# Patient Record
Sex: MALE | Race: WHITE | HISPANIC OR LATINO | Employment: FULL TIME | URBAN - METROPOLITAN AREA
[De-identification: names, ages, dates, MRNs, and addresses within clinical notes are randomized per-mention and may not be internally consistent; named-entity substitution may affect disease eponyms.]

---

## 2017-03-15 ENCOUNTER — HOSPITAL ENCOUNTER (EMERGENCY)
Facility: HOSPITAL | Age: 42
Discharge: HOME | End: 2017-03-15
Attending: EMERGENCY MEDICINE

## 2017-03-15 ENCOUNTER — APPOINTMENT (EMERGENCY)
Dept: RADIOLOGY | Facility: HOSPITAL | Age: 42
End: 2017-03-15

## 2017-03-15 ENCOUNTER — APPOINTMENT (EMERGENCY)
Dept: ULTRASOUND IMAGING | Facility: HOSPITAL | Age: 42
End: 2017-03-15

## 2017-03-15 VITALS
TEMPERATURE: 98.5 F | WEIGHT: 216 LBS | HEART RATE: 69 BPM | OXYGEN SATURATION: 98 % | DIASTOLIC BLOOD PRESSURE: 86 MMHG | RESPIRATION RATE: 17 BRPM | SYSTOLIC BLOOD PRESSURE: 148 MMHG

## 2017-03-15 DIAGNOSIS — N13.30 HYDRONEPHROSIS OF RIGHT KIDNEY: Chronic | ICD-10-CM

## 2017-03-15 DIAGNOSIS — R10.9 RIGHT FLANK PAIN: ICD-10-CM

## 2017-03-15 DIAGNOSIS — R07.89 CHEST WALL PAIN: Primary | ICD-10-CM

## 2017-03-15 LAB
ANION GAP SERPL CALCULATED.3IONS-SCNC: 9 MMOL/L (ref 4–13)
APTT PPP: 30 SECONDS (ref 24–36)
BASOPHILS # BLD AUTO: 0.04 THOUSANDS/ΜL (ref 0–0.1)
BASOPHILS NFR BLD AUTO: 1 % (ref 0–1)
BILIRUB UR QL STRIP: NEGATIVE
BUN SERPL-MCNC: 11 MG/DL (ref 5–25)
CALCIUM SERPL-MCNC: 9.2 MG/DL (ref 8.3–10.1)
CHLORIDE SERPL-SCNC: 103 MMOL/L (ref 100–108)
CLARITY UR: CLEAR
CLARITY, POC: CLEAR
CO2 SERPL-SCNC: 28 MMOL/L (ref 21–32)
COLOR UR: YELLOW
COLOR, POC: YELLOW
CREAT SERPL-MCNC: 1.01 MG/DL (ref 0.6–1.3)
EOSINOPHIL # BLD AUTO: 0.43 THOUSAND/ΜL (ref 0–0.61)
EOSINOPHIL NFR BLD AUTO: 6 % (ref 0–6)
ERYTHROCYTE [DISTWIDTH] IN BLOOD BY AUTOMATED COUNT: 12.8 % (ref 11.6–15.1)
GFR SERPL CREATININE-BSD FRML MDRD: >60 ML/MIN/1.73SQ M
GLUCOSE SERPL-MCNC: 101 MG/DL (ref 65–140)
GLUCOSE UR STRIP-MCNC: NEGATIVE MG/DL
HCT VFR BLD AUTO: 43 % (ref 36.5–49.3)
HGB BLD-MCNC: 14.9 G/DL (ref 12–17)
HGB UR QL STRIP.AUTO: NEGATIVE
INR PPP: 0.93 (ref 0.86–1.16)
KETONES UR STRIP-MCNC: NEGATIVE MG/DL
LEUKOCYTE ESTERASE UR QL STRIP: NEGATIVE
LYMPHOCYTES # BLD AUTO: 2.61 THOUSANDS/ΜL (ref 0.6–4.47)
LYMPHOCYTES NFR BLD AUTO: 35 % (ref 14–44)
MCH RBC QN AUTO: 30.5 PG (ref 26.8–34.3)
MCHC RBC AUTO-ENTMCNC: 34.7 G/DL (ref 31.4–37.4)
MCV RBC AUTO: 88 FL (ref 82–98)
MONOCYTES # BLD AUTO: 0.66 THOUSAND/ΜL (ref 0.17–1.22)
MONOCYTES NFR BLD AUTO: 9 % (ref 4–12)
NEUTROPHILS # BLD AUTO: 3.83 THOUSANDS/ΜL (ref 1.85–7.62)
NEUTS SEG NFR BLD AUTO: 49 % (ref 43–75)
NITRITE UR QL STRIP: NEGATIVE
NRBC BLD AUTO-RTO: 0 /100 WBCS
NT-PROBNP SERPL-MCNC: 11 PG/ML
PH UR STRIP.AUTO: 6 [PH] (ref 4.5–8)
PLATELET # BLD AUTO: 344 THOUSANDS/UL (ref 149–390)
PMV BLD AUTO: 9.8 FL (ref 8.9–12.7)
POTASSIUM SERPL-SCNC: 3.4 MMOL/L (ref 3.5–5.3)
PROT UR STRIP-MCNC: NEGATIVE MG/DL
PROTHROMBIN TIME: 12.5 SECONDS (ref 12–14.3)
RBC # BLD AUTO: 4.89 MILLION/UL (ref 3.88–5.62)
SODIUM SERPL-SCNC: 140 MMOL/L (ref 136–145)
SP GR UR STRIP.AUTO: 1.01 (ref 1–1.03)
SPECIMEN SOURCE: NORMAL
TROPONIN I BLD-MCNC: 0 NG/ML (ref 0–0.08)
UROBILINOGEN UR QL STRIP.AUTO: 0.2 E.U./DL
WBC # BLD AUTO: 7.57 THOUSAND/UL (ref 4.31–10.16)

## 2017-03-15 PROCEDURE — 76770 US EXAM ABDO BACK WALL COMP: CPT

## 2017-03-15 PROCEDURE — 85730 THROMBOPLASTIN TIME PARTIAL: CPT

## 2017-03-15 PROCEDURE — 71020 HB CHEST X-RAY 2VW FRONTAL&LATL: CPT

## 2017-03-15 PROCEDURE — 36415 COLL VENOUS BLD VENIPUNCTURE: CPT

## 2017-03-15 PROCEDURE — 81003 URINALYSIS AUTO W/O SCOPE: CPT

## 2017-03-15 PROCEDURE — 80048 BASIC METABOLIC PNL TOTAL CA: CPT

## 2017-03-15 PROCEDURE — 81002 URINALYSIS NONAUTO W/O SCOPE: CPT | Performed by: EMERGENCY MEDICINE

## 2017-03-15 PROCEDURE — 96374 THER/PROPH/DIAG INJ IV PUSH: CPT

## 2017-03-15 PROCEDURE — 99285 EMERGENCY DEPT VISIT HI MDM: CPT

## 2017-03-15 PROCEDURE — 85025 COMPLETE CBC W/AUTO DIFF WBC: CPT

## 2017-03-15 PROCEDURE — 84484 ASSAY OF TROPONIN QUANT: CPT

## 2017-03-15 PROCEDURE — 93005 ELECTROCARDIOGRAM TRACING: CPT | Performed by: EMERGENCY MEDICINE

## 2017-03-15 PROCEDURE — 85610 PROTHROMBIN TIME: CPT

## 2017-03-15 PROCEDURE — 83880 ASSAY OF NATRIURETIC PEPTIDE: CPT

## 2017-03-15 RX ORDER — KETOROLAC TROMETHAMINE 30 MG/ML
30 INJECTION, SOLUTION INTRAMUSCULAR; INTRAVENOUS ONCE
Status: COMPLETED | OUTPATIENT
Start: 2017-03-15 | End: 2017-03-15

## 2017-03-15 RX ADMIN — KETOROLAC TROMETHAMINE 30 MG: 30 INJECTION, SOLUTION INTRAMUSCULAR at 18:38

## 2017-03-16 LAB
ATRIAL RATE: 67 BPM
P AXIS: 54 DEGREES
PR INTERVAL: 172 MS
QRS AXIS: 39 DEGREES
QRSD INTERVAL: 110 MS
QT INTERVAL: 370 MS
QTC INTERVAL: 390 MS
T WAVE AXIS: 47 DEGREES
VENTRICULAR RATE: 67 BPM

## 2019-04-08 ENCOUNTER — TELEPHONE (OUTPATIENT)
Dept: UROLOGY | Facility: MEDICAL CENTER | Age: 44
End: 2019-04-08

## 2019-04-15 ENCOUNTER — OFFICE VISIT (OUTPATIENT)
Dept: UROLOGY | Facility: MEDICAL CENTER | Age: 44
End: 2019-04-15
Payer: COMMERCIAL

## 2019-04-15 VITALS
HEART RATE: 102 BPM | SYSTOLIC BLOOD PRESSURE: 130 MMHG | WEIGHT: 200 LBS | DIASTOLIC BLOOD PRESSURE: 80 MMHG | HEIGHT: 71 IN | BODY MASS INDEX: 28 KG/M2

## 2019-04-15 DIAGNOSIS — R10.11 RIGHT UPPER QUADRANT PAIN: Primary | ICD-10-CM

## 2019-04-15 DIAGNOSIS — N13.30 HYDRONEPHROSIS OF RIGHT KIDNEY: ICD-10-CM

## 2019-04-15 LAB
SL AMB  POCT GLUCOSE, UA: NORMAL
SL AMB LEUKOCYTE ESTERASE,UA: NORMAL
SL AMB POCT BILIRUBIN,UA: NORMAL
SL AMB POCT BLOOD,UA: NORMAL
SL AMB POCT CLARITY,UA: CLEAR
SL AMB POCT COLOR,UA: YELLOW
SL AMB POCT KETONES,UA: NORMAL
SL AMB POCT NITRITE,UA: NORMAL
SL AMB POCT PH,UA: 6.5
SL AMB POCT SPECIFIC GRAVITY,UA: 1.02
SL AMB POCT URINE PROTEIN: NORMAL
SL AMB POCT UROBILINOGEN: 0.2

## 2019-04-15 PROCEDURE — 81003 URINALYSIS AUTO W/O SCOPE: CPT | Performed by: UROLOGY

## 2019-04-15 PROCEDURE — 99204 OFFICE O/P NEW MOD 45 MIN: CPT | Performed by: UROLOGY

## 2019-04-15 RX ORDER — NEOMYCIN SULFATE, POLYMYXIN B SULFATE AND HYDROCORTISONE 10; 3.5; 1 MG/ML; MG/ML; [USP'U]/ML
SUSPENSION/ DROPS AURICULAR (OTIC)
COMMUNITY
Start: 2019-02-25 | End: 2019-04-15 | Stop reason: ALTCHOICE

## 2019-04-15 RX ORDER — MOMETASONE FUROATE 50 UG/1
SPRAY, METERED NASAL
COMMUNITY
Start: 2019-02-25

## 2019-04-15 RX ORDER — MONTELUKAST SODIUM 10 MG/1
10 TABLET ORAL
COMMUNITY
Start: 2018-09-18 | End: 2021-06-17

## 2019-04-15 RX ORDER — MOMETASONE FUROATE 50 UG/1
2 SPRAY, METERED NASAL
COMMUNITY
Start: 2019-02-25 | End: 2019-04-15 | Stop reason: SDUPTHER

## 2019-04-15 RX ORDER — GUAIFENESIN, PSEUDOEPHEDRINE HYDROCHLORIDE 600; 60 MG/1; MG/1
1 TABLET, EXTENDED RELEASE ORAL EVERY 12 HOURS
COMMUNITY
Start: 2019-02-25 | End: 2019-04-15 | Stop reason: ALTCHOICE

## 2019-04-15 RX ORDER — ALBUTEROL SULFATE 90 UG/1
2 AEROSOL, METERED RESPIRATORY (INHALATION) EVERY 6 HOURS PRN
COMMUNITY

## 2019-04-15 RX ORDER — NAPROXEN SODIUM 220 MG
220 TABLET ORAL
COMMUNITY

## 2019-04-16 ENCOUNTER — TELEPHONE (OUTPATIENT)
Dept: UROLOGY | Facility: AMBULATORY SURGERY CENTER | Age: 44
End: 2019-04-16

## 2019-04-16 DIAGNOSIS — R10.11 RIGHT UPPER QUADRANT PAIN: Primary | ICD-10-CM

## 2019-04-22 ENCOUNTER — APPOINTMENT (OUTPATIENT)
Dept: LAB | Facility: HOSPITAL | Age: 44
End: 2019-04-22
Attending: UROLOGY
Payer: COMMERCIAL

## 2019-04-22 DIAGNOSIS — R10.11 RIGHT UPPER QUADRANT PAIN: ICD-10-CM

## 2019-04-22 DIAGNOSIS — N13.30 HYDRONEPHROSIS OF RIGHT KIDNEY: ICD-10-CM

## 2019-04-22 LAB
ALBUMIN SERPL BCP-MCNC: 3.8 G/DL (ref 3.5–5)
ALP SERPL-CCNC: 92 U/L (ref 46–116)
ALT SERPL W P-5'-P-CCNC: 42 U/L (ref 12–78)
ANION GAP SERPL CALCULATED.3IONS-SCNC: 9 MMOL/L (ref 4–13)
AST SERPL W P-5'-P-CCNC: 27 U/L (ref 5–45)
BILIRUB SERPL-MCNC: 0.52 MG/DL (ref 0.2–1)
BUN SERPL-MCNC: 11 MG/DL (ref 5–25)
CALCIUM SERPL-MCNC: 9.3 MG/DL (ref 8.3–10.1)
CHLORIDE SERPL-SCNC: 104 MMOL/L (ref 100–108)
CO2 SERPL-SCNC: 28 MMOL/L (ref 21–32)
CREAT SERPL-MCNC: 1.04 MG/DL (ref 0.6–1.3)
GFR SERPL CREATININE-BSD FRML MDRD: 89 ML/MIN/1.73SQ M
GLUCOSE P FAST SERPL-MCNC: 83 MG/DL (ref 65–99)
POTASSIUM SERPL-SCNC: 3.9 MMOL/L (ref 3.5–5.3)
PROT SERPL-MCNC: 8.1 G/DL (ref 6.4–8.2)
SODIUM SERPL-SCNC: 141 MMOL/L (ref 136–145)

## 2019-04-22 PROCEDURE — 36415 COLL VENOUS BLD VENIPUNCTURE: CPT

## 2019-04-22 PROCEDURE — 80053 COMPREHEN METABOLIC PANEL: CPT

## 2019-04-24 ENCOUNTER — HOSPITAL ENCOUNTER (OUTPATIENT)
Dept: CT IMAGING | Facility: HOSPITAL | Age: 44
Discharge: HOME | End: 2019-04-24
Attending: UROLOGY
Payer: COMMERCIAL

## 2019-04-24 DIAGNOSIS — R10.11 RIGHT UPPER QUADRANT PAIN: ICD-10-CM

## 2019-04-24 PROCEDURE — 74178 CT ABD&PLV WO CNTR FLWD CNTR: CPT

## 2019-04-24 RX ADMIN — IOHEXOL 100 ML: 350 INJECTION, SOLUTION INTRAVENOUS at 18:01

## 2019-08-23 ENCOUNTER — HOSPITAL ENCOUNTER (EMERGENCY)
Facility: HOSPITAL | Age: 44
Discharge: HOME | End: 2019-08-23
Attending: EMERGENCY MEDICINE | Admitting: EMERGENCY MEDICINE
Payer: COMMERCIAL

## 2019-08-23 VITALS
TEMPERATURE: 97.5 F | SYSTOLIC BLOOD PRESSURE: 140 MMHG | DIASTOLIC BLOOD PRESSURE: 85 MMHG | RESPIRATION RATE: 18 BRPM | OXYGEN SATURATION: 99 % | HEART RATE: 68 BPM | WEIGHT: 209 LBS | BODY MASS INDEX: 29.15 KG/M2

## 2019-08-23 DIAGNOSIS — N48.89 PEARLY PENILE PAPULES: Primary | ICD-10-CM

## 2019-08-23 LAB
BILIRUB UR QL STRIP: NEGATIVE
CLARITY UR: CLEAR
COLOR UR: YELLOW
COLOR, POC: YELLOW
GLUCOSE UR STRIP-MCNC: NEGATIVE MG/DL
HGB UR QL STRIP.AUTO: NEGATIVE
KETONES UR STRIP-MCNC: NEGATIVE MG/DL
LEUKOCYTE ESTERASE UR QL STRIP: NEGATIVE
NITRITE UR QL STRIP: NEGATIVE
PH UR STRIP.AUTO: 7 [PH] (ref 4.5–8)
PROT UR STRIP-MCNC: NEGATIVE MG/DL
SP GR UR STRIP.AUTO: 1.01 (ref 1–1.03)
UROBILINOGEN UR QL STRIP.AUTO: 0.2 E.U./DL

## 2019-08-23 PROCEDURE — 86592 SYPHILIS TEST NON-TREP QUAL: CPT | Performed by: EMERGENCY MEDICINE

## 2019-08-23 PROCEDURE — 87591 N.GONORRHOEAE DNA AMP PROB: CPT | Performed by: EMERGENCY MEDICINE

## 2019-08-23 PROCEDURE — 87491 CHLMYD TRACH DNA AMP PROBE: CPT | Performed by: EMERGENCY MEDICINE

## 2019-08-23 PROCEDURE — 99283 EMERGENCY DEPT VISIT LOW MDM: CPT

## 2019-08-23 PROCEDURE — 81003 URINALYSIS AUTO W/O SCOPE: CPT

## 2019-08-23 PROCEDURE — 87389 HIV-1 AG W/HIV-1&-2 AB AG IA: CPT | Performed by: EMERGENCY MEDICINE

## 2019-08-23 PROCEDURE — 36415 COLL VENOUS BLD VENIPUNCTURE: CPT | Performed by: EMERGENCY MEDICINE

## 2019-08-23 PROCEDURE — 99283 EMERGENCY DEPT VISIT LOW MDM: CPT | Performed by: EMERGENCY MEDICINE

## 2019-08-23 NOTE — ED PROVIDER NOTES
History  Chief Complaint   Patient presents with    Possible UTI     Pt reports dysuria x2 days  Also c/o lower back pain  Patient is a 53-year-old male  He is worried because he noticed small bumps to the base of his glans  There are not painful  Although did experience a little bit of dysuria  No fever  No penile discharge  No abdominal pain  No hematuria  No nausea or vomiting  No flank pain  He did have unprotected oral sex and is worried that he might have caught something  Prior to Admission Medications   Prescriptions Last Dose Informant Patient Reported? Taking? albuterol (PROVENTIL HFA,VENTOLIN HFA) 90 mcg/act inhaler   Yes No   Sig: Inhale 2 puffs every 6 (six) hours as needed for wheezing   mometasone (NASONEX) 50 mcg/act nasal spray   Yes No   montelukast (SINGULAIR) 10 mg tablet   Yes No   Sig: Take 10 mg by mouth   naproxen sodium (ALEVE) 220 MG tablet   Yes No   Sig: Take 220 mg by mouth      Facility-Administered Medications: None       Past Medical History:   Diagnosis Date    Arthritis     Asthma     Back pain     Hydronephrosis     Kidney stones     Stomach pain        Past Surgical History:   Procedure Laterality Date    BLADDER SURGERY  2009       History reviewed  No pertinent family history  I have reviewed and agree with the history as documented  Social History     Tobacco Use    Smoking status: Never Smoker    Smokeless tobacco: Never Used   Substance Use Topics    Alcohol use: Yes     Comment: social    Drug use: No        Review of Systems   Constitutional: Negative for chills and fever  HENT: Negative for rhinorrhea and sore throat  Eyes: Negative for pain, redness and visual disturbance  Respiratory: Negative for cough and shortness of breath  Cardiovascular: Negative for chest pain and leg swelling  Gastrointestinal: Negative for abdominal pain, diarrhea and vomiting  Endocrine: Negative for polydipsia and polyuria  Genitourinary: Positive for dysuria  Negative for discharge, frequency, hematuria, penile pain, scrotal swelling and testicular pain  Musculoskeletal: Negative for back pain and neck pain  Skin: Negative for rash and wound  Allergic/Immunologic: Negative for immunocompromised state  Neurological: Negative for weakness, numbness and headaches  Psychiatric/Behavioral: Negative for hallucinations and suicidal ideas  All other systems reviewed and are negative  Physical Exam  Physical Exam   Constitutional: He is oriented to person, place, and time  He appears well-developed and well-nourished  No distress  HENT:   Head: Normocephalic and atraumatic  Eyes: Right eye exhibits no discharge  Left eye exhibits no discharge  No scleral icterus  Neck: Normal range of motion  Neck supple  Cardiovascular: Normal rate, regular rhythm, normal heart sounds and intact distal pulses  Exam reveals no gallop and no friction rub  No murmur heard  Pulmonary/Chest: Effort normal and breath sounds normal  No respiratory distress  He has no wheezes  He has no rales  Abdominal: Soft  Bowel sounds are normal  He exhibits no distension  There is no tenderness  There is no rebound and no guarding  Genitourinary:   Genitourinary Comments: No penile discharge  There are few flesh-colored papules that are nontender noted to the base of the glans  No ulcerations  Testicles are nontender  Musculoskeletal: Normal range of motion  He exhibits no edema, tenderness or deformity  Neurological: He is alert and oriented to person, place, and time  He has normal strength  No sensory deficit  GCS eye subscore is 4  GCS verbal subscore is 5  GCS motor subscore is 6  Skin: Skin is warm and dry  No rash noted  He is not diaphoretic  Psychiatric: He has a normal mood and affect  His behavior is normal    Vitals reviewed        Vital Signs  ED Triage Vitals [08/23/19 1510]   Temperature Pulse Respirations Blood Pressure SpO2   97 5 °F (36 4 °C) 65 18 146/88 96 %      Temp Source Heart Rate Source Patient Position - Orthostatic VS BP Location FiO2 (%)   Temporal Monitor Sitting Right arm --      Pain Score       7           Vitals:    08/23/19 1510   BP: 146/88   Pulse: 65   Patient Position - Orthostatic VS: Sitting         Visual Acuity      ED Medications  Medications - No data to display    Diagnostic Studies  Results Reviewed     Procedure Component Value Units Date/Time    Chlamydia/GC amplified DNA by PCR [[id number]]     Lab Status:  No result Specimen:  Urine, Other     RPR [[id number]]     Lab Status:  No result Specimen:  Blood     HIV 1/2 AG-AB combo [[id number]]     Lab Status:  No result Specimen:  Blood     POCT urinalysis dipstick [[id number]]  (Normal) Resulted:  08/23/19 1654    Lab Status:  Final result Updated:  08/23/19 1654     Color, UA Yellow    ED Urine Macroscopic [[id number]] Collected:  08/23/19 1649    Lab Status:  Final result Specimen:  Urine Updated:  08/23/19 1650     Color, UA Yellow     Clarity, UA Clear     pH, UA 7 0     Leukocytes, UA Negative     Nitrite, UA Negative     Protein, UA Negative mg/dl      Glucose, UA Negative mg/dl      Ketones, UA Negative mg/dl      Urobilinogen, UA 0 2 E U /dl      Bilirubin, UA Negative     Blood, UA Negative     Specific Gravity, UA 1 015    Narrative:       CLINITEK RESULT                 No orders to display              Procedures  Procedures       ED Course                               MDM  Number of Diagnoses or Management Options  Pearly penile papules:   Diagnosis management comments: These lesions do not appear consistent with any particular STD  These are likely pearly penile papules  Nevertheless, will test for STDs  Will refer to Urology for follow-up         Amount and/or Complexity of Data Reviewed  Clinical lab tests: ordered and reviewed        Disposition  Final diagnoses:   Pearly penile papules     Time reflects when diagnosis was documented in both MDM as applicable and the Disposition within this note     Time User Action Codes Description Comment    8/23/2019  5:14 PM Peyton Wolflu Add [C09 71] Pearly penile papules       ED Disposition     ED Disposition Condition Date/Time Comment    Discharge Stable Fri Aug 23, 2019  5:13 PM Maribel Franks discharge to home/self care  Follow-up Information     Follow up With Specialties Details Why Contact Info    Follow-up with your urologist in 1 week for re-evaluation  Patient's Medications   Discharge Prescriptions    No medications on file     No discharge procedures on file      ED Provider  Electronically Signed by           Megan Santos MD  08/23/19 0206

## 2019-08-23 NOTE — ED NOTES
During triage pt states, "I would like a male doctor if possible because there are other things I would like to discuss"  Pt unwilling to elaborate in triage        Nikki Duval RN  08/23/19 8108

## 2019-08-24 LAB — RPR SER QL: NORMAL

## 2019-08-25 LAB — HIV 1+2 AB+HIV1 P24 AG SERPL QL IA: NORMAL

## 2019-08-26 LAB
C TRACH DNA SPEC QL NAA+PROBE: NEGATIVE
N GONORRHOEA DNA SPEC QL NAA+PROBE: NEGATIVE

## 2021-05-11 ENCOUNTER — TELEPHONE (OUTPATIENT)
Dept: UROLOGY | Facility: MEDICAL CENTER | Age: 46
End: 2021-05-11

## 2021-05-11 NOTE — TELEPHONE ENCOUNTER
Patient of DR Livier Owens in [facility]      Patient called stating he was in ER 05/06/21 at [facility] with Right side quad pain and abdominal pain  He states he still having pain  DX is Hydronephrosis  Patient had bladder surgery in 2009  Lab tested blood in Urine  He saw  physician group  They stated he should follow up with Urology  He would like to know how to proceed

## 2021-05-13 NOTE — TELEPHONE ENCOUNTER
2nd attempt to contact patient  Pt did not answer and unable to leave a voicemail at this time since his mailbox has not been set up yet  Will try to contact patient at a later time

## 2021-05-17 NOTE — TELEPHONE ENCOUNTER
3rd attempt to contact patient:    Unable to leave message - voicemail not set up yet  Will await for the patient to call us back

## 2021-06-16 RX ORDER — OMEPRAZOLE 20 MG/1
20 CAPSULE, DELAYED RELEASE ORAL DAILY
COMMUNITY
Start: 2021-06-04 | End: 2021-09-29

## 2021-06-17 ENCOUNTER — OFFICE VISIT (OUTPATIENT)
Dept: UROLOGY | Facility: MEDICAL CENTER | Age: 46
End: 2021-06-17
Payer: COMMERCIAL

## 2021-06-17 VITALS
SYSTOLIC BLOOD PRESSURE: 100 MMHG | DIASTOLIC BLOOD PRESSURE: 60 MMHG | BODY MASS INDEX: 31.78 KG/M2 | HEIGHT: 68 IN | HEART RATE: 72 BPM

## 2021-06-17 DIAGNOSIS — N13.30 HYDRONEPHROSIS, UNSPECIFIED HYDRONEPHROSIS TYPE: Primary | ICD-10-CM

## 2021-06-17 PROCEDURE — 99214 OFFICE O/P EST MOD 30 MIN: CPT | Performed by: NURSE PRACTITIONER

## 2021-06-17 RX ORDER — NEOMYCIN SULFATE, POLYMYXIN B SULFATE, HYDROCORTISONE 3.5; 10000; 1 MG/ML; [USP'U]/ML; MG/ML
SOLUTION/ DROPS AURICULAR (OTIC) AS NEEDED
COMMUNITY
Start: 2021-06-04

## 2021-06-17 NOTE — PROGRESS NOTES
6/17/2021    Assessment and Plan    39 y o  male managed by Dr Hal Ramirez  1   Right-sided hydronephrosis  ·  status post right ureteroneocystostomy approximately 12 years ago  ·  CT abdomen pelvis performed 05/06/2021 shows mild right-sided hydronephrosis with no calculi  Appearance suggestive of slight progression of chronic appearing UPJ obstruction  · NM renal scan ordered    · BUN/ Cr- 14/0 96 performed 05/06/2021  ·  patient will follow-up in the office in 4-6 weeks with Dr Hal Ramirez    History of Present Illness  Christiano Toussaint is a 39 y o  male here for follow up evaluation of    bladder diverticulum who underwent right ureteroneocystostomy 12 years ago  He presents to the office today for follow-up to emergency room evaluation on 05/06/2021  He presented to the emergency department with complaints of right flank pain and right lower quadrant abdominal pain  CT scan the abdomen pelvis revealed mild right-sided hydronephrosis with the appearance suggestive of slight progression of chronic appearing UPJ obstruction  Patient denies fever and chills  He denies all lower urinary tract symptoms  He reports his urinary stream to be strong  Review of Systems   Constitutional: Negative for chills and fever  Respiratory: Negative for cough and shortness of breath  Cardiovascular: Negative for chest pain  Gastrointestinal: Negative for abdominal distention, abdominal pain, blood in stool, nausea and vomiting  Genitourinary: Positive for flank pain  Negative for difficulty urinating, dysuria, enuresis, frequency, hematuria and urgency  Musculoskeletal: Negative for back pain  Skin: Negative for rash  Neurological: Negative for dizziness       Past Medical History  Past Medical History:   Diagnosis Date    Arthritis     Asthma     Back pain     Hydronephrosis     Kidney stones     Stomach pain        Past Social History  Past Surgical History:   Procedure Laterality Date    BLADDER SURGERY  2009     Social History     Tobacco Use   Smoking Status Never Smoker   Smokeless Tobacco Never Used       Past Family History  History reviewed  No pertinent family history  Past Social history  Social History     Socioeconomic History    Marital status: Single     Spouse name: Not on file    Number of children: Not on file    Years of education: Not on file    Highest education level: Not on file   Occupational History    Not on file   Tobacco Use    Smoking status: Never Smoker    Smokeless tobacco: Never Used   Substance and Sexual Activity    Alcohol use: Yes     Comment: social    Drug use: No    Sexual activity: Not on file   Other Topics Concern    Not on file   Social History Narrative    Not on file     Social Determinants of Health     Financial Resource Strain:     Difficulty of Paying Living Expenses:    Food Insecurity:     Worried About Running Out of Food in the Last Year:     [address] in the Last Year:    Transportation Needs:     Lack of Transportation (Medical):      Lack of Transportation (Non-Medical):    Physical Activity:     Days of Exercise per Week:     Minutes of Exercise per Session:    Stress:     Feeling of Stress :    Social Connections:     Frequency of Communication with Friends and Family:     Frequency of Social Gatherings with Friends and Family:     Attends Rastafarian Services:     Active Member of Clubs or Organizations:     Attends Club or Organization Meetings:     Marital Status:    Intimate Partner Violence:     Fear of Current or Ex-Partner:     Emotionally Abused:     Physically Abused:     Sexually Abused:        Current Medications  Current Outpatient Medications   Medication Sig Dispense Refill    mometasone (NASONEX) 50 mcg/act nasal spray       montelukast (SINGULAIR) 10 mg tablet Take 10 mg by mouth      omeprazole (PriLOSEC) 20 mg delayed release capsule Take 20 mg by mouth daily      albuterol (PROVENTIL HFA,VENTOLIN HFA) 90 mcg/act inhaler Inhale 2 puffs every 6 (six) hours as needed for wheezing      Diclofenac Sodium (VOLTAREN) 1 % Apply 1 application topically 4 (four) times a day      naproxen sodium (ALEVE) 220 MG tablet Take 220 mg by mouth      neomycin-polymyxin-hydrocortisone (CORTISPORIN) 1 % SOLN        No current facility-administered medications for this visit  Allergies  Allergies   Allergen Reactions    Penicillins     Tuberculin Other (See Comments)         The following portions of the patient's history were reviewed and updated as appropriate: allergies, current medications, past medical history, past social history, past surgical history and problem list       Vitals  Vitals:    06/17/21 0918   BP: 100/60   Pulse: 72   Height: 5' 8" (1 727 m)           Physical Exam  Physical Exam  Vitals reviewed  Constitutional:       General: He is not in acute distress  Appearance: Normal appearance  He is normal weight  Cardiovascular:      Rate and Rhythm: Normal rate  Pulmonary:      Effort: No respiratory distress  Breath sounds: Normal breath sounds  Abdominal:      Tenderness: There is no right CVA tenderness or left CVA tenderness  Skin:     General: Skin is warm and dry  Neurological:      General: No focal deficit present  Mental Status: He is alert and oriented to person, place, and time  Psychiatric:         Mood and Affect: Mood normal          Behavior: Behavior normal        Results  No results found for this or any previous visit (from the past 1 hour(s))  ]  No results found for: PSA  Lab Results   Component Value Date    CALCIUM 9 3 04/22/2019    K 3 9 04/22/2019    CO2 28 04/22/2019     04/22/2019    BUN 11 04/22/2019    CREATININE 1 04 04/22/2019     Lab Results   Component Value Date    WBC 7 57 03/15/2017    HGB 14 9 03/15/2017    HCT 43 0 03/15/2017    MCV 88 03/15/2017     03/15/2017     CT ABDOMEN PELVIS W IV CONTRAST ONLY 5/6/2021    Impression    Impression: No intrarenal or ureteral calculi  Moderate right hydronephrosis,   slightly increased since the prior examination  Stable appearance of the right   ureter with normal caliber along its proximal to mid aspect and mild dilatation   along its distal aspect  Appearance is suggestive of slight progression of   chronic appearing UPJ obstruction favored to be of post inflammatory etiology  Mild diffuse bladder wall thickening  Findings could be secondary to acute   cystitis or acute on chronic inflammatory changes  Clinical correlation   suggested  Other findings as described above with no significant change  Orders  Orders Placed This Encounter   Procedures    NM kidney w flow and function     Standing Status:   Future     Standing Expiration Date:   6/17/2025     Scheduling Instructions:      "Please bring your insurance cards, a form of photo ID and a list of your medications with you  Arrive 15 minutes prior to your appointment time in order to register  On the day of your test, please bring any prior studies of this area with you that were not performed at a [facility]  Renal w Lasix      Patient must drink 2-3 8 oz glasses of water about 1 hour to 1 5 hours before the exam  Please empty bladder as needed  Do not need a full bladder  Renal w Captopril      Do not eat any solid foods after midnight before the exam  Patient must drink 2-4 8oz glasses of fluids before coming in for the exam  If patient is on an ace drug (i e  captopril, monopril, vasotec) they must discontinue 72hours prior to the exam   Verify with your doctor before discontinuing  "            To schedule this appointment, please contact Central Scheduling at [phone]       Order Specific Question:   Reason for Exam (FREE TEXT)     Answer:   hydronephrosis       ANANTH Hopkins

## 2021-06-17 NOTE — PATIENT INSTRUCTIONS
Schedule Renal scan  Continue with hydration upwards to 40 oz per day  Continue to monitor for progression and worsening of urinary symptom

## 2021-07-09 ENCOUNTER — HOSPITAL ENCOUNTER (OUTPATIENT)
Dept: NUCLEAR MEDICINE | Facility: HOSPITAL | Age: 46
Discharge: HOME | End: 2021-07-09
Payer: COMMERCIAL

## 2021-07-09 DIAGNOSIS — N13.30 HYDRONEPHROSIS, UNSPECIFIED HYDRONEPHROSIS TYPE: ICD-10-CM

## 2021-07-09 PROCEDURE — A9562 TC99M MERTIATIDE: HCPCS

## 2021-07-09 PROCEDURE — 78707 K FLOW/FUNCT IMAGE W/O DRUG: CPT

## 2021-07-09 PROCEDURE — G1004 CDSM NDSC: HCPCS

## 2021-07-09 RX ORDER — FUROSEMIDE 10 MG/ML
40 INJECTION INTRAMUSCULAR; INTRAVENOUS
Status: COMPLETED | OUTPATIENT
Start: 2021-07-09 | End: 2021-07-09

## 2021-07-09 RX ADMIN — FUROSEMIDE 40 MG: 10 INJECTION, SOLUTION INTRAMUSCULAR; INTRAVENOUS at 11:21

## 2021-09-29 ENCOUNTER — OFFICE VISIT (OUTPATIENT)
Dept: UROLOGY | Facility: MEDICAL CENTER | Age: 46
End: 2021-09-29
Payer: COMMERCIAL

## 2021-09-29 VITALS — WEIGHT: 217 LBS | BODY MASS INDEX: 32.89 KG/M2 | HEIGHT: 68 IN

## 2021-09-29 DIAGNOSIS — Z87.448 HISTORY OF URINARY REFLUX: Primary | ICD-10-CM

## 2021-09-29 DIAGNOSIS — N13.39 OTHER HYDRONEPHROSIS: ICD-10-CM

## 2021-09-29 PROCEDURE — 99214 OFFICE O/P EST MOD 30 MIN: CPT | Performed by: UROLOGY

## 2021-09-29 RX ORDER — TRAMADOL HYDROCHLORIDE 50 MG/1
TABLET ORAL
COMMUNITY
Start: 2021-06-22

## 2021-09-29 RX ORDER — CYCLOBENZAPRINE HCL 10 MG
TABLET ORAL AS NEEDED
COMMUNITY
Start: 2021-09-08

## 2021-09-29 NOTE — PROGRESS NOTES
Assessment/Plan:  1  History of EU reflux status post right ureteroneocystostomy 12 years ago  2  Mild right hydronephrosis-no evidence of upper tract obstruction by nuclear medicine renal scan  Mild right hydronephrosis may be due to chronic long-term vu reflux prior to anti reflux surgery with her without complete resolution  No problem-specific Assessment & Plan notes found for this encounter  Diagnoses and all orders for this visit:    History of urinary reflux    Other hydronephrosis    Other orders  -     cyclobenzaprine (FLEXERIL) 10 mg tablet; as needed   -     traMADol (ULTRAM) 50 mg tablet;  (Patient not taking: Reported on 9/29/2021)          Subjective:      Patient ID: Jasmin Buenrostro is a 42 y o  male  HPI  [id number]  o  male here for follow up evaluation of    bladder diverticulum who underwent right ureteroneocystostomy 12 years ago  He presents to the office today for follow-up to emergency room evaluation on 05/06/2021  He presented to the emergency department with complaints of right flank pain and right lower quadrant abdominal pain  CT scan the abdomen pelvis revealed mild right-sided hydronephrosis with the appearance suggestive of slight progression of chronic appearing UPJ obstruction  Patient denies fever and chills  He denies all lower urinary tract symptoms  He reports his urinary stream to be strong  The patient underwent radionuclide renal scan with Lasix washout which did not reveal any evidence of upper urinary tract obstruction ruling out hydronephrosis due to obstruction  Patient presents for discussion  The following portions of the patient's history were reviewed and updated as appropriate: allergies, current medications, past family history, past medical history, past social history, past surgical history and problem list     Review of Systems   Gastrointestinal: Positive for abdominal pain          Mild occasional right upper quadrant abdominal discomfort self-limited   All other systems reviewed and are negative  Objective:      Ht 5' 8" (1 727 m)   Wt 98 4 kg (217 lb)   BMI 32 99 kg/m²          Physical Exam  Vitals reviewed  Constitutional:       General: He is not in acute distress  Appearance: Normal appearance  He is obese  He is not ill-appearing, toxic-appearing or diaphoretic  HENT:      Head: Normocephalic and atraumatic  Mouth/Throat:      Mouth: Mucous membranes are moist    Eyes:      Extraocular Movements: Extraocular movements intact  Pulmonary:      Effort: Pulmonary effort is normal  No respiratory distress  Abdominal:      Palpations: Abdomen is soft  Genitourinary:     Penis: Normal        Testes: Normal    Musculoskeletal:         General: Normal range of motion  Neurological:      Mental Status: He is alert and oriented to person, place, and time  Psychiatric:         Mood and Affect: Mood normal          Behavior: Behavior normal          Thought Content:  Thought content normal          Judgment: Judgment normal

## 2021-10-15 ENCOUNTER — TELEPHONE (OUTPATIENT)
Dept: UROLOGY | Facility: AMBULATORY SURGERY CENTER | Age: 46
End: 2021-10-15